# Patient Record
Sex: FEMALE | Race: BLACK OR AFRICAN AMERICAN | URBAN - METROPOLITAN AREA
[De-identification: names, ages, dates, MRNs, and addresses within clinical notes are randomized per-mention and may not be internally consistent; named-entity substitution may affect disease eponyms.]

---

## 2021-09-22 ENCOUNTER — WEB ENCOUNTER (OUTPATIENT)
Dept: URBAN - METROPOLITAN AREA CLINIC 105 | Facility: CLINIC | Age: 36
End: 2021-09-22

## 2021-09-23 ENCOUNTER — OFFICE VISIT (OUTPATIENT)
Dept: URBAN - METROPOLITAN AREA CLINIC 17 | Facility: CLINIC | Age: 36
End: 2021-09-23
Payer: COMMERCIAL

## 2021-09-23 ENCOUNTER — DASHBOARD ENCOUNTERS (OUTPATIENT)
Age: 36
End: 2021-09-23

## 2021-09-23 ENCOUNTER — WEB ENCOUNTER (OUTPATIENT)
Dept: URBAN - METROPOLITAN AREA CLINIC 17 | Facility: CLINIC | Age: 36
End: 2021-09-23

## 2021-09-23 ENCOUNTER — OFFICE VISIT (OUTPATIENT)
Dept: URBAN - METROPOLITAN AREA CLINIC 17 | Facility: CLINIC | Age: 36
End: 2021-09-23

## 2021-09-23 VITALS
SYSTOLIC BLOOD PRESSURE: 122 MMHG | HEART RATE: 71 BPM | WEIGHT: 165 LBS | BODY MASS INDEX: 27.49 KG/M2 | HEIGHT: 65 IN | TEMPERATURE: 97.7 F | DIASTOLIC BLOOD PRESSURE: 70 MMHG

## 2021-09-23 DIAGNOSIS — K59.09 CHRONIC CONSTIPATION: ICD-10-CM

## 2021-09-23 DIAGNOSIS — K62.5 RECTAL BLEEDING: ICD-10-CM

## 2021-09-23 DIAGNOSIS — K64.8 OTHER HEMORRHOIDS: ICD-10-CM

## 2021-09-23 PROCEDURE — 99204 OFFICE O/P NEW MOD 45 MIN: CPT | Performed by: INTERNAL MEDICINE

## 2021-09-23 RX ORDER — HYDROCORTISONE ACETATE 25 MG/1
1 SUPPOSITORY SUPPOSITORY RECTAL
Qty: 28 | Refills: 0 | OUTPATIENT
Start: 2021-09-23 | End: 2021-10-07

## 2021-09-23 NOTE — HPI-TODAY'S VISIT:
9/23/21 35 yo lady pt referred by Dr Henny Dacosta. She had a baby 6 weeks ago. A son.  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 Says this has been ongoing since her early 20s. She is s/p banding at that time which helps "but then it came back". Most of her family have had hemorrhoidectomies.  She has one child. She has intermittent hemorrhoidal pain. some mild rectal bleeding in the setting of constipation. She has a BM once every 3 days, stools are hard with straining.  She had a colonoscopy 15 years ago which was normal. She was having blood in stool at the time. Rectal bleeding occurs about once a month, bright red blood, mixed in stool.

## 2021-10-21 ENCOUNTER — OFFICE VISIT (OUTPATIENT)
Dept: URBAN - METROPOLITAN AREA CLINIC 17 | Facility: CLINIC | Age: 36
End: 2021-10-21

## 2021-10-21 NOTE — HPI-TODAY'S VISIT:
9/23/21 37 yo lady pt referred by Dr Henny Dacosta. She had a baby 6 weeks ago. A son.  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 Says this has been ongoing since her early 20s. She is s/p banding at that time which helps "but then it came back". Most of her family have had hemorrhoidectomies.  She has one child. She has intermittent hemorrhoidal pain. some mild rectal bleeding in the setting of constipation. She has a BM once every 3 days, stools are hard with straining.  She had a colonoscopy 15 years ago which was normal. She was having blood in stool at the time. Rectal bleeding occurs about once a month, bright red blood, mixed in stool.

## 2021-11-10 ENCOUNTER — OFFICE VISIT (OUTPATIENT)
Dept: URBAN - METROPOLITAN AREA CLINIC 105 | Facility: CLINIC | Age: 36
End: 2021-11-10